# Patient Record
Sex: MALE | Race: BLACK OR AFRICAN AMERICAN | NOT HISPANIC OR LATINO | URBAN - METROPOLITAN AREA
[De-identification: names, ages, dates, MRNs, and addresses within clinical notes are randomized per-mention and may not be internally consistent; named-entity substitution may affect disease eponyms.]

---

## 2021-06-02 ENCOUNTER — OFFICE VISIT (OUTPATIENT)
Dept: URBAN - METROPOLITAN AREA CLINIC 118 | Facility: CLINIC | Age: 66
End: 2021-06-02
Payer: COMMERCIAL

## 2021-06-02 DIAGNOSIS — B96.81 HELICOBACTER PYLORI [H. PYLORI] AS THE CAUSE OF DISEASES CLASSIFIED ELSEWHERE: ICD-10-CM

## 2021-06-02 DIAGNOSIS — K26.4 DUODENAL ULCER WITH HEMORRHAGE: ICD-10-CM

## 2021-06-02 PROBLEM — 13200003: Status: ACTIVE | Noted: 2021-06-02

## 2021-06-02 PROCEDURE — 99204 OFFICE O/P NEW MOD 45 MIN: CPT | Performed by: INTERNAL MEDICINE

## 2021-06-02 RX ORDER — METFORMIN HYDROCHLORIDE 1000 MG/1
1 TABLET WITH A MEAL TABLET, FILM COATED ORAL ONCE A DAY
Status: ACTIVE | COMMUNITY

## 2021-06-02 RX ORDER — AMLODIPINE AND VALSARTAN 10; 160 MG/1; MG/1
TABLET, FILM COATED ORAL
Qty: 30 UNSPECIFIED | Status: ACTIVE | COMMUNITY

## 2021-06-02 RX ORDER — GLIPIZIDE 10 MG/1
TABLET ORAL
Qty: 120 UNSPECIFIED | Status: ACTIVE | COMMUNITY

## 2021-06-02 RX ORDER — CYCLOBENZAPRINE 10 MG/1
TABLET, FILM COATED ORAL
Qty: 30 UNSPECIFIED | Status: ACTIVE | COMMUNITY

## 2021-06-02 RX ORDER — IBUPROFEN SODIUM 256 MG/1
1 TABLET WITH FOOD OR MILK AS NEEDED TABLET, COATED ORAL THREE TIMES A DAY
Status: ACTIVE | COMMUNITY

## 2021-06-02 RX ORDER — ATORVASTATIN CALCIUM, FILM COATED 10 MG/1
TABLET ORAL
Qty: 90 UNSPECIFIED | Status: ACTIVE | COMMUNITY

## 2021-06-02 RX ORDER — SITAGLIPTIN 100 MG/1
TABLET, FILM COATED ORAL
Qty: 30 UNSPECIFIED | Status: ACTIVE | COMMUNITY

## 2021-06-02 NOTE — HPI-TODAY'S VISIT:
The patient is a 66 yo male who self-referred for dyspepsia and a hx of H pylori/bleeding ulcers.  He had a first episode of bleeding ulcers in April 2020 ([facility]).  He was positive for H pylori, but not treated until 1/2021 (his report) as he was taking the medication at home (antiacid) and had no complaints of N/V/abdominal pain/melena.  When the symptoms recurred in January, he went to Dr Wynn, and started treatment for the H pylori.  He also had been taking high-dose ibuprofen for back pain, and this has been stopped for several months.  He denies a prior hx of GI bleeding.  He says on PPI therapy/off NSAIDs and after treatment of H pylori, that his stomach feels great.  He has not been tested for eradication of the H pylori with either stool or breath test.

## 2021-06-03 ENCOUNTER — DASHBOARD ENCOUNTERS (OUTPATIENT)
Age: 66
End: 2021-06-03

## 2021-06-03 PROBLEM — 27281001: Status: ACTIVE | Noted: 2021-06-03
